# Patient Record
(demographics unavailable — no encounter records)

---

## 2025-02-25 NOTE — PHYSICAL EXAM
[Alert] : alert [No Acute Distress] : no acute distress [Sclera] : the sclera and conjunctiva were normal [Oropharynx] : the oropharynx was normal [Normal Appearance] : the appearance of the neck was normal [No Respiratory Distress] : no respiratory distress [No Acc Muscle Use] : no accessory muscle use [Respiration, Rhythm And Depth] : normal respiratory rhythm and effort [Normal S1, S2] : normal S1 and S2 [Bowel Sounds] : normal bowel sounds [Abdomen Tenderness] : non-tender [Abdomen Soft] : soft [Rebound Tenderness] : no rebound tenderness [No CVA Tenderness] : no CVA  tenderness [Abnormal Walk] : normal gait [Normal Color / Pigmentation] : normal skin color and pigmentation [Oriented To Time, Place, And Person] : oriented to person, place, and time [Normal Affect] : the affect was normal [Normal Mood] : the mood was normal

## 2025-02-25 NOTE — HISTORY OF PRESENT ILLNESS
[FreeTextEntry1] : Patient is a 54-year-old female who presents to the gastroenterology office for evaluation prior to scheduling colonoscopy.  Patient reports she underwent a prior colonoscopy for colon cancer screening in 2019 by Dr. Tim Carnes.  She reports subsequently being informed by Dr. Carnes to have a repeat colonoscopy in 5 years.  Per chart, patient underwent the prior colonoscopy in May 2019 with Dr. Carnes for colon cancer screening.  Subsequent recommendation by Dr. Carnes was for repeat colonoscopy in 2024.  During office visit today, patient reports no acute complaints or symptoms.  Patient denies abdominal pain, chest pain, shortness of breath, nausea or vomiting.  Patient reports having 1-2 bowel movements per day at baseline.  Patient denies red blood in stools and denies black stools.  Patient denies unintentional weight loss.  Patient denies family history of colon cancer or rectal cancer.

## 2025-02-25 NOTE — ASSESSMENT
[FreeTextEntry1] : Patient is a 54-year-old female seen in the gastroenterology office prior to scheduling colonoscopy.  Patient currently without any acute complaints or symptoms from GI perspective.  Will schedule patient for colonoscopy for colon cancer screening.  Discussed indication, benefits/risk and alternatives to colonoscopy with the patient.  She reported understanding and she is in agreement with proceeding with colonoscopy.  All of her questions were answered.     --Schedule colonoscopy.

## 2025-03-25 NOTE — PHYSICAL EXAM
[Normal Appearance] : normal appearance [] : no respiratory distress [Abdomen Soft] : soft [Abdomen Tenderness] : non-tender [Costovertebral Angle Tenderness] : no ~M costovertebral angle tenderness

## 2025-03-25 NOTE — ASSESSMENT
[FreeTextEntry1] : Pt is a 53 yo F here for:   #kidney stones  - ULS reviewed - single stone in RLP, mildly increased in size. No additional stones seen. CTU 2019 also reviewed - there is a 4mm midpole stone - discussed this could be same stone that is growing - Given size of stone, did discuss could consider surgical options, specifically ESWL, ureteroscopy, PCNL.  She would like to continue going observation - Discussed primary stone risk factors are pH and variable elevated calcium. Likely stable to improved. --Cont increased fluid intake --Calcium intake should be approximately 1000 mg per day (800-1200mg). --Salt intake should be reduced as high levels in the diet will increase urinary calcium. <2400 mg per day on a low sodium diet is strongly recommended. --Citrate is a benefit; tr and limes with most citrate and least sugar- recommend 'a lemon or lime a day'; easiest with concentrate, mixed into water or other beverages. --repeat 24 hours urine x 2 --F/u in 1y with Dr Beltran with Renal US eval.

## 2025-03-25 NOTE — HISTORY OF PRESENT ILLNESS
[FreeTextEntry1] : 54-year-old female who presents for follow-up  Previous patient of Dr. Batres - never been seen by me. She is followed for:  #Stones Seen for microscopic hematuria 2019. CT urogram at that time demonstrating bilateral stones She has been followed with annual ultrasounds, last approximately 1 year ago.  This demonstrated right sided 4 and 7 mm stones, and a right lower pole cyst.  There were no left-sided stones seen but there was a LUP cyst  She underwent 24 hour urine 2023 - this showed excellent urine volumes (3-4L) with elevated urinary calcium (150-250) and elevated pH (6.8-6.9). Salt intake ok but high normal. She was counseled about dietary changes. Took litholyte temporarily but forgot to order when she ran out.  Currently, no flank pain, dysuria, hematuria, fevers, chills.